# Patient Record
Sex: MALE | Race: WHITE | NOT HISPANIC OR LATINO | Employment: FULL TIME | URBAN - METROPOLITAN AREA
[De-identification: names, ages, dates, MRNs, and addresses within clinical notes are randomized per-mention and may not be internally consistent; named-entity substitution may affect disease eponyms.]

---

## 2023-11-28 ENCOUNTER — LAB VISIT (OUTPATIENT)
Dept: LAB | Facility: HOSPITAL | Age: 48
End: 2023-11-28
Attending: INTERNAL MEDICINE
Payer: COMMERCIAL

## 2023-11-28 ENCOUNTER — OFFICE VISIT (OUTPATIENT)
Dept: HEPATOLOGY | Facility: CLINIC | Age: 48
End: 2023-11-28
Payer: COMMERCIAL

## 2023-11-28 VITALS
TEMPERATURE: 96 F | HEART RATE: 88 BPM | RESPIRATION RATE: 18 BRPM | OXYGEN SATURATION: 98 % | WEIGHT: 243.19 LBS | SYSTOLIC BLOOD PRESSURE: 123 MMHG | HEIGHT: 70 IN | DIASTOLIC BLOOD PRESSURE: 78 MMHG | BODY MASS INDEX: 34.82 KG/M2

## 2023-11-28 DIAGNOSIS — K76.9 LIVER DISEASE, UNSPECIFIED: ICD-10-CM

## 2023-11-28 DIAGNOSIS — K76.9 LIVER LESION: ICD-10-CM

## 2023-11-28 DIAGNOSIS — K76.0 FATTY LIVER: ICD-10-CM

## 2023-11-28 DIAGNOSIS — E66.9 OBESITY (BMI 30.0-34.9): ICD-10-CM

## 2023-11-28 LAB
AFP SERPL-MCNC: 4.9 NG/ML (ref 0–8.4)
ALBUMIN SERPL BCP-MCNC: 4.1 G/DL (ref 3.5–5.2)
ALP SERPL-CCNC: 60 U/L (ref 55–135)
ALT SERPL W/O P-5'-P-CCNC: 33 U/L (ref 10–44)
ANION GAP SERPL CALC-SCNC: 8 MMOL/L (ref 8–16)
AST SERPL-CCNC: 25 U/L (ref 10–40)
BASOPHILS # BLD AUTO: 0.05 K/UL (ref 0–0.2)
BASOPHILS NFR BLD: 0.8 % (ref 0–1.9)
BILIRUB DIRECT SERPL-MCNC: 0.1 MG/DL (ref 0.1–0.3)
BILIRUB SERPL-MCNC: 0.4 MG/DL (ref 0.1–1)
BUN SERPL-MCNC: 11 MG/DL (ref 6–20)
CALCIUM SERPL-MCNC: 9.4 MG/DL (ref 8.7–10.5)
CANCER AG19-9 SERPL-ACNC: 3.4 U/ML (ref 0–40)
CEA SERPL-MCNC: 1.7 NG/ML (ref 0–5)
CERULOPLASMIN SERPL-MCNC: 21 MG/DL (ref 15–45)
CHLORIDE SERPL-SCNC: 107 MMOL/L (ref 95–110)
CO2 SERPL-SCNC: 25 MMOL/L (ref 23–29)
CREAT SERPL-MCNC: 1.1 MG/DL (ref 0.5–1.4)
DIFFERENTIAL METHOD: ABNORMAL
EOSINOPHIL # BLD AUTO: 0.3 K/UL (ref 0–0.5)
EOSINOPHIL NFR BLD: 5.3 % (ref 0–8)
ERYTHROCYTE [DISTWIDTH] IN BLOOD BY AUTOMATED COUNT: 11.6 % (ref 11.5–14.5)
EST. GFR  (NO RACE VARIABLE): >60 ML/MIN/1.73 M^2
FERRITIN SERPL-MCNC: 305 NG/ML (ref 20–300)
GLUCOSE SERPL-MCNC: 95 MG/DL (ref 70–110)
HAV IGG SER QL IA: REACTIVE
HBV CORE AB SERPL QL IA: NORMAL
HBV SURFACE AG SERPL QL IA: NORMAL
HCT VFR BLD AUTO: 43.4 % (ref 40–54)
HCV AB SERPL QL IA: NORMAL
HGB BLD-MCNC: 15 G/DL (ref 14–18)
HIV 1+2 AB+HIV1 P24 AG SERPL QL IA: NORMAL
IMM GRANULOCYTES # BLD AUTO: 0.02 K/UL (ref 0–0.04)
IMM GRANULOCYTES NFR BLD AUTO: 0.3 % (ref 0–0.5)
IRON SERPL-MCNC: 87 UG/DL (ref 45–160)
LYMPHOCYTES # BLD AUTO: 2.6 K/UL (ref 1–4.8)
LYMPHOCYTES NFR BLD: 39.7 % (ref 18–48)
MCH RBC QN AUTO: 31.9 PG (ref 27–31)
MCHC RBC AUTO-ENTMCNC: 34.6 G/DL (ref 32–36)
MCV RBC AUTO: 92 FL (ref 82–98)
MONOCYTES # BLD AUTO: 0.7 K/UL (ref 0.3–1)
MONOCYTES NFR BLD: 11.1 % (ref 4–15)
NEUTROPHILS # BLD AUTO: 2.8 K/UL (ref 1.8–7.7)
NEUTROPHILS NFR BLD: 42.8 % (ref 38–73)
NRBC BLD-RTO: 0 /100 WBC
PLATELET # BLD AUTO: 236 K/UL (ref 150–450)
PMV BLD AUTO: 11.2 FL (ref 9.2–12.9)
POTASSIUM SERPL-SCNC: 3.8 MMOL/L (ref 3.5–5.1)
PROT SERPL-MCNC: 7.6 G/DL (ref 6–8.4)
RBC # BLD AUTO: 4.7 M/UL (ref 4.6–6.2)
SATURATED IRON: 21 % (ref 20–50)
SODIUM SERPL-SCNC: 140 MMOL/L (ref 136–145)
TOTAL IRON BINDING CAPACITY: 416 UG/DL (ref 250–450)
TRANSFERRIN SERPL-MCNC: 281 MG/DL (ref 200–375)
WBC # BLD AUTO: 6.42 K/UL (ref 3.9–12.7)

## 2023-11-28 PROCEDURE — 86790 VIRUS ANTIBODY NOS: CPT | Performed by: INTERNAL MEDICINE

## 2023-11-28 PROCEDURE — 80076 HEPATIC FUNCTION PANEL: CPT | Performed by: INTERNAL MEDICINE

## 2023-11-28 PROCEDURE — 3078F PR MOST RECENT DIASTOLIC BLOOD PRESSURE < 80 MM HG: ICD-10-PCS | Mod: CPTII,S$GLB,, | Performed by: INTERNAL MEDICINE

## 2023-11-28 PROCEDURE — 86803 HEPATITIS C AB TEST: CPT | Performed by: INTERNAL MEDICINE

## 2023-11-28 PROCEDURE — 3074F PR MOST RECENT SYSTOLIC BLOOD PRESSURE < 130 MM HG: ICD-10-PCS | Mod: CPTII,S$GLB,, | Performed by: INTERNAL MEDICINE

## 2023-11-28 PROCEDURE — 3078F DIAST BP <80 MM HG: CPT | Mod: CPTII,S$GLB,, | Performed by: INTERNAL MEDICINE

## 2023-11-28 PROCEDURE — 82103 ALPHA-1-ANTITRYPSIN TOTAL: CPT | Performed by: INTERNAL MEDICINE

## 2023-11-28 PROCEDURE — 82378 CARCINOEMBRYONIC ANTIGEN: CPT | Performed by: INTERNAL MEDICINE

## 2023-11-28 PROCEDURE — 3074F SYST BP LT 130 MM HG: CPT | Mod: CPTII,S$GLB,, | Performed by: INTERNAL MEDICINE

## 2023-11-28 PROCEDURE — 87389 HIV-1 AG W/HIV-1&-2 AB AG IA: CPT | Performed by: INTERNAL MEDICINE

## 2023-11-28 PROCEDURE — 86301 IMMUNOASSAY TUMOR CA 19-9: CPT | Performed by: INTERNAL MEDICINE

## 2023-11-28 PROCEDURE — 87340 HEPATITIS B SURFACE AG IA: CPT | Performed by: INTERNAL MEDICINE

## 2023-11-28 PROCEDURE — 82104 ALPHA-1-ANTITRYPSIN PHENO: CPT | Performed by: INTERNAL MEDICINE

## 2023-11-28 PROCEDURE — 86706 HEP B SURFACE ANTIBODY: CPT | Performed by: INTERNAL MEDICINE

## 2023-11-28 PROCEDURE — 99999 PR PBB SHADOW E&M-NEW PATIENT-LVL IV: ICD-10-PCS | Mod: PBBFAC,,, | Performed by: INTERNAL MEDICINE

## 2023-11-28 PROCEDURE — 82105 ALPHA-FETOPROTEIN SERUM: CPT | Performed by: INTERNAL MEDICINE

## 2023-11-28 PROCEDURE — 99203 OFFICE O/P NEW LOW 30 MIN: CPT | Mod: S$GLB,,, | Performed by: INTERNAL MEDICINE

## 2023-11-28 PROCEDURE — 85025 COMPLETE CBC W/AUTO DIFF WBC: CPT | Performed by: INTERNAL MEDICINE

## 2023-11-28 PROCEDURE — 83540 ASSAY OF IRON: CPT | Performed by: INTERNAL MEDICINE

## 2023-11-28 PROCEDURE — 1159F PR MEDICATION LIST DOCUMENTED IN MEDICAL RECORD: ICD-10-PCS | Mod: CPTII,S$GLB,, | Performed by: INTERNAL MEDICINE

## 2023-11-28 PROCEDURE — 86038 ANTINUCLEAR ANTIBODIES: CPT | Performed by: INTERNAL MEDICINE

## 2023-11-28 PROCEDURE — 3008F BODY MASS INDEX DOCD: CPT | Mod: CPTII,S$GLB,, | Performed by: INTERNAL MEDICINE

## 2023-11-28 PROCEDURE — 99999 PR PBB SHADOW E&M-NEW PATIENT-LVL IV: CPT | Mod: PBBFAC,,, | Performed by: INTERNAL MEDICINE

## 2023-11-28 PROCEDURE — 80048 BASIC METABOLIC PNL TOTAL CA: CPT | Performed by: INTERNAL MEDICINE

## 2023-11-28 PROCEDURE — 82728 ASSAY OF FERRITIN: CPT | Performed by: INTERNAL MEDICINE

## 2023-11-28 PROCEDURE — 1159F MED LIST DOCD IN RCRD: CPT | Mod: CPTII,S$GLB,, | Performed by: INTERNAL MEDICINE

## 2023-11-28 PROCEDURE — 99203 PR OFFICE/OUTPT VISIT, NEW, LEVL III, 30-44 MIN: ICD-10-PCS | Mod: S$GLB,,, | Performed by: INTERNAL MEDICINE

## 2023-11-28 PROCEDURE — 1160F PR REVIEW ALL MEDS BY PRESCRIBER/CLIN PHARMACIST DOCUMENTED: ICD-10-PCS | Mod: CPTII,S$GLB,, | Performed by: INTERNAL MEDICINE

## 2023-11-28 PROCEDURE — 86704 HEP B CORE ANTIBODY TOTAL: CPT | Performed by: INTERNAL MEDICINE

## 2023-11-28 PROCEDURE — 1160F RVW MEDS BY RX/DR IN RCRD: CPT | Mod: CPTII,S$GLB,, | Performed by: INTERNAL MEDICINE

## 2023-11-28 PROCEDURE — 84466 ASSAY OF TRANSFERRIN: CPT | Performed by: INTERNAL MEDICINE

## 2023-11-28 PROCEDURE — 3008F PR BODY MASS INDEX (BMI) DOCUMENTED: ICD-10-PCS | Mod: CPTII,S$GLB,, | Performed by: INTERNAL MEDICINE

## 2023-11-28 PROCEDURE — 82390 ASSAY OF CERULOPLASMIN: CPT | Performed by: INTERNAL MEDICINE

## 2023-11-28 RX ORDER — ROSUVASTATIN CALCIUM 5 MG/1
5 TABLET, COATED ORAL DAILY
COMMUNITY

## 2023-11-28 RX ORDER — OMEPRAZOLE 20 MG/1
TABLET, DELAYED RELEASE ORAL DAILY
COMMUNITY

## 2023-11-28 NOTE — PROGRESS NOTES
"Hepatology Consult Note    Referring provider: Aaareferral Self  PCP: No, Primary Doctor    Chief complaint: liver lesions    HPI:  Jm Saab is a 48 y.o. male with obesity (BMI 34.9), HLD, LEO, who was referred to Hepatology Clinic for evaluation of liver lesions. First diagnosed with fatty liver and liver lesions after having an ultrasound of the abdomen for post-prandial abdominal pain.     Regarding alcohol use, he 6-12 12oz ciders, or ~2 bottles wine on one day during the weekend. This happens maybe 1-2 a month. No prior attempts structure sobriety.  Denies illicit drug use, blood transfusions, prior tattoos. MASLD risk factors: obesity (current weight is heavies weight), HLD. Denies history of DM, HTN. Does not know his family history as he is adopted.    The patient denies signs/symptoms of decompensated liver disease, including no recent abdominal distension, jaundice, gross GI bleeding. The patient denies prior evaluation by hepatologist, liver biopsy, paracentesis.    PMHx:  - Obesity  - HLD  - LEO     PSHx:   - Denies prior abdominal surgeries.     History reviewed. No pertinent family history.    Social History     Tobacco Use    Smoking status: Former     Types: Cigarettes    Smokeless tobacco: Former       Current Outpatient Medications   Medication Sig Dispense Refill    omeprazole (PRILOSEC OTC) 20 MG tablet Take by mouth once daily.      rosuvastatin (CRESTOR) 5 MG tablet Take 5 mg by mouth once daily.       No current facility-administered medications for this visit.       Review of patient's allergies indicates:  No Known Allergies         Vitals:    11/28/23 1344   BP: 123/78   Pulse: 88   Resp: 18   Temp: 96.2 °F (35.7 °C)   TempSrc: Oral   SpO2: 98%   Weight: 110.3 kg (243 lb 2.7 oz)   Height: 5' 10" (1.778 m)   Body mass index is 34.89 kg/m².    Physical Exam  Constitutional:       General: He is not in acute distress.  Eyes:      General: No scleral icterus.  Cardiovascular:      Rate and " "Rhythm: Normal rate.   Pulmonary:      Effort: Pulmonary effort is normal.   Abdominal:      General: Abdomen is protuberant. There is no distension.      Palpations: Abdomen is soft. There is no fluid wave, hepatomegaly or splenomegaly.      Tenderness: There is no abdominal tenderness.   Musculoskeletal:      Right lower leg: No edema.      Left lower leg: No edema.   Skin:     General: Skin is warm.      Coloration: Skin is not jaundiced.      Comments: No spider angiomas. No palmar erythema. No leukonychia.    Neurological:      General: No focal deficit present.      Mental Status: He is alert and oriented to person, place, and time.   Psychiatric:         Behavior: Behavior is cooperative.         Thought Content: Thought content normal.         LABS: I personally reviewed pertinent laboratory findings.    No results found for: "WBC", "HGB", "HCT", "MCV", "PLT"    No results found for: "NA", "K", "CL", "CO2", "BUN", "CREATININE", "CALCIUM", "ANIONGAP", "ESTGFRAFRICA", "EGFRNONAA"    No results found for: "ALT", "AST", "GGT", "ALKPHOS", "BILITOT"    No results found for: "HAV", "HEPAIGM", "HEPBIGM", "HEPBCAB", "HBEAG", "HEPCAB"    No results found for: "MARJORIE", "MITOAB", "SMOOTHMUSCAB", "IGG", "CERULOPLSM"     Computed MELD 3.0 unavailable. Necessary lab results were not found in the last year.  Computed MELD-Na unavailable. Necessary lab results were not found in the last year.       IMAGING: I personally reviewed imaging studies.    US Abdomen Complete 11/10/2023  There are 3 hyperechoic lesions within the anterior right liver lobe, favor hemangiomata.  Consider correlation with CT or MRI.     Echogenic liver parenchyma, likely mild hepatic steatosis.     Cholelithiasis without secondary findings of acute cholecystitis.     Assessment:  Jm Saab is a 48 y.o. male with obesity (BMI 34.9), HLD, LEO, who was referred to Hepatology Clinic for evaluation of liver lesions. Per review of outside records, liver " enzymes were within normal limits in 2023. Bilirubin and albumin is normal. Platelets within normal limits. On ultrasound, the liver has increased echogenicity, likely from mild steatosis, and there are 3 lesions in the right lobe, which likely represent hemangiomata. Plan  to complete chronic liver disease workup, non-invasive measures of fibrosis, and dedicated imaging of the liver.     1. Liver lesion    2. Fatty liver    3. Liver disease, unspecified    4. Obesity (BMI 30.0-34.9)      Recommendations:  - BMP, LFTs, CBC, INR  - MARJORIE, A1AT phenotype, ceruloplasmin   - Iron, TIBC, ferritin   - HAV IgG, HBsAg, HBsAb, HBc Total Ab, HCV ab, HIV  - FibroSure, FibroScan   - AFP, CEA, CA 19-9   - MRI abdomen   - Counseled on weight loss (goal 5-10% body weight), Mediterranean diet     Return to clinic in 3 months.    I have sent communication to the referring physician and/or primary care provider.    Anahi Billings MD  Staff Physician  Hepatology and Liver Transplant  [facility]  [facility] [place]

## 2023-11-29 DIAGNOSIS — K76.9 LIVER LESION: Primary | ICD-10-CM

## 2023-11-29 DIAGNOSIS — K76.9 LIVER DISEASE, UNSPECIFIED: ICD-10-CM

## 2023-11-29 LAB — ANA SER QL IF: NORMAL

## 2023-12-01 LAB
A1AT PHENOTYP SERPL-IMP: NORMAL BANDS
A1AT SERPL NEPH-MCNC: 126 MG/DL (ref 100–190)
HBV SURFACE AB SER QL IA: NEGATIVE
HBV SURFACE AB SERPL IA-ACNC: <3 MIU/ML

## 2023-12-04 ENCOUNTER — LAB VISIT (OUTPATIENT)
Dept: LAB | Facility: HOSPITAL | Age: 48
End: 2023-12-04
Payer: COMMERCIAL

## 2023-12-04 DIAGNOSIS — K76.9 LIVER LESION: ICD-10-CM

## 2023-12-04 DIAGNOSIS — K76.9 LIVER DISEASE, UNSPECIFIED: ICD-10-CM

## 2023-12-04 PROCEDURE — 36415 COLL VENOUS BLD VENIPUNCTURE: CPT | Mod: PN | Performed by: INTERNAL MEDICINE

## 2023-12-04 PROCEDURE — 81596 NFCT DS CHRNC HCV 6 ASSAYS: CPT | Performed by: INTERNAL MEDICINE

## 2023-12-06 LAB
A2 MACROGLOB SERPL-MCNC: 142 MG/DL (ref 100–280)
ALT SERPL W P-5'-P-CCNC: 38 U/L (ref 7–55)
APO A-I SERPL-MCNC: 128 MG/DL
BILIRUB SERPL-MCNC: 0.3 MG/DL (ref 0–1.2)
BIOPREDICTIVE SERIAL NUMBER: NORMAL
FIBROSIS STAGE SERPL QL: NORMAL
FIBROTEST INTERPRETATION: NORMAL
FIBROTEST-ACTITEST COMMENT: NORMAL
GGT SERPL-CCNC: 28 U/L (ref 8–61)
HAPTOGLOB SERPL-MCNC: 175 MG/DL (ref 30–200)
LIVER FIBR SCORE SERPL CALC.FIBROSURE: 0.09
NECROINFLAMMAT INTERP: NORMAL
NECROINFLAMMATORY ACT GRADE SERPL QL: NORMAL
NECROINFLAMMATORY ACT SCORE SERPL: 0.16

## 2023-12-20 ENCOUNTER — HOSPITAL ENCOUNTER (OUTPATIENT)
Dept: RADIOLOGY | Facility: HOSPITAL | Age: 48
Discharge: HOME | End: 2023-12-20
Attending: INTERNAL MEDICINE
Payer: COMMERCIAL

## 2023-12-20 DIAGNOSIS — K76.9 LIVER LESION: ICD-10-CM

## 2023-12-20 DIAGNOSIS — K76.9 LIVER DISEASE, UNSPECIFIED: ICD-10-CM

## 2023-12-20 PROCEDURE — A9585 GADOBUTROL INJECTION: HCPCS | Performed by: INTERNAL MEDICINE

## 2023-12-20 PROCEDURE — 74183 MRI ABDOMEN W WO CONTRAST: ICD-10-PCS | Mod: 26,,, | Performed by: RADIOLOGY

## 2023-12-20 PROCEDURE — 25500020 PHARM REV CODE 255: Performed by: INTERNAL MEDICINE

## 2023-12-20 PROCEDURE — 74183 MRI ABD W/O CNTR FLWD CNTR: CPT | Mod: 26,,, | Performed by: RADIOLOGY

## 2023-12-20 PROCEDURE — 74183 MRI ABD W/O CNTR FLWD CNTR: CPT | Mod: TC

## 2023-12-20 RX ORDER — GADOBUTROL 604.72 MG/ML
10 INJECTION INTRAVENOUS
Status: COMPLETED | OUTPATIENT
Start: 2023-12-20 | End: 2023-12-20

## 2023-12-20 RX ADMIN — GADOBUTROL 10 ML: 604.72 INJECTION INTRAVENOUS at 07:12

## 2024-02-26 ENCOUNTER — OFFICE VISIT (OUTPATIENT)
Dept: HEPATOLOGY | Facility: CLINIC | Age: 49
End: 2024-02-26
Payer: COMMERCIAL

## 2024-02-26 ENCOUNTER — PROCEDURE VISIT (OUTPATIENT)
Dept: HEPATOLOGY | Facility: CLINIC | Age: 49
End: 2024-02-26
Payer: COMMERCIAL

## 2024-02-26 VITALS
HEART RATE: 72 BPM | SYSTOLIC BLOOD PRESSURE: 135 MMHG | RESPIRATION RATE: 19 BRPM | WEIGHT: 243.19 LBS | BODY MASS INDEX: 34.82 KG/M2 | OXYGEN SATURATION: 98 % | DIASTOLIC BLOOD PRESSURE: 72 MMHG | HEIGHT: 70 IN

## 2024-02-26 DIAGNOSIS — K76.9 LIVER DISEASE, UNSPECIFIED: ICD-10-CM

## 2024-02-26 DIAGNOSIS — Z78.9 ALCOHOL USE: ICD-10-CM

## 2024-02-26 DIAGNOSIS — K76.0 FATTY LIVER: ICD-10-CM

## 2024-02-26 DIAGNOSIS — E66.9 CLASS 1 OBESITY WITH BODY MASS INDEX (BMI) OF 34.0 TO 34.9 IN ADULT, UNSPECIFIED OBESITY TYPE, UNSPECIFIED WHETHER SERIOUS COMORBIDITY PRESENT: ICD-10-CM

## 2024-02-26 DIAGNOSIS — K76.9 LIVER LESION: ICD-10-CM

## 2024-02-26 DIAGNOSIS — D18.03 HEPATIC HEMANGIOMA: ICD-10-CM

## 2024-02-26 PROCEDURE — 99999 PR PBB SHADOW E&M-EST. PATIENT-LVL III: CPT | Mod: PBBFAC,,, | Performed by: INTERNAL MEDICINE

## 2024-02-26 PROCEDURE — 3008F BODY MASS INDEX DOCD: CPT | Mod: CPTII,S$GLB,, | Performed by: INTERNAL MEDICINE

## 2024-02-26 PROCEDURE — 91200 LIVER ELASTOGRAPHY: CPT | Mod: S$GLB,,, | Performed by: INTERNAL MEDICINE

## 2024-02-26 PROCEDURE — 99213 OFFICE O/P EST LOW 20 MIN: CPT | Mod: S$GLB,,, | Performed by: INTERNAL MEDICINE

## 2024-02-26 PROCEDURE — 1159F MED LIST DOCD IN RCRD: CPT | Mod: CPTII,S$GLB,, | Performed by: INTERNAL MEDICINE

## 2024-02-26 PROCEDURE — 1160F RVW MEDS BY RX/DR IN RCRD: CPT | Mod: CPTII,S$GLB,, | Performed by: INTERNAL MEDICINE

## 2024-02-26 PROCEDURE — 3078F DIAST BP <80 MM HG: CPT | Mod: CPTII,S$GLB,, | Performed by: INTERNAL MEDICINE

## 2024-02-26 PROCEDURE — 3075F SYST BP GE 130 - 139MM HG: CPT | Mod: CPTII,S$GLB,, | Performed by: INTERNAL MEDICINE

## 2024-02-26 NOTE — PROCEDURES
FibroScan [place]    Date/Time: 2/26/2024 8:30 AM    Performed by: Cindy Mejia MA  Authorized by: Anahi Billings MD    Diagnosis:  NAFLD    Probe:  XL    Universal Protocol: Patient's identity, procedure and site were verified, confirmatory pause was performed.  Discussed procedure including risks and potential complications.  Questions answered.  Patient verbalizes understanding and wishes to proceed with VCTE.     Procedure: After providing explanations of the procedure, patient was placed in the supine position with right arm in maximum abduction to allow optimal exposure of right lateral abdomen.  Patient was briefly assessed, Testing was performed in the mid-axillary location, 50Hz Shear Wave pulses were applied and the resulting Shear Wave and Propagation Speed detected with a 3.5 MHz ultrasonic signal, using the FibroScan probe, Skin to liver capsule distance and liver parenchyma were accessed during the entire examination with the FibroScan probe, Patient was instructed to breathe normally and to abstain from sudden movements during the procedure, allowing for random measurements of liver stiffness. At least 10 Shear Waves were produced, Individual measurements of each Shear Wave were calculated.  Patient tolerated the procedure well with no complications.  Meets discharge criteria as was dismissed.  Rates pain 0 out of 10.  Patient will follow up with ordering provider to review results.    Findings  Median liver stiffness score:  4  CAP Reading: dB/m:  315    Interpretation  Fibrosis interpretation is based on medial liver stiffness - Kilopascal (kPa).    Fibrosis Stage:  F 0-1  Steatosis interpretation is based on controlled attenuation parameter - (dB/m).    Steatosis Grade:  S2

## 2024-02-26 NOTE — PROGRESS NOTES
"Hepatology Follow Up Note    Referring provider: No ref. provider found  PCP: No, Primary Doctor    Chief complaint: follow up liver lesions    Last seen in clinic on: 11/28/23    Brief HPI:  Jm Saab is a 48 y.o. male with liver lesions, fatty liver, obesity (BMI 34), HLD, LEO, who presents for scheduled follow up.    Interval Events:  - Feels well. Denies acute complaints.  - Currently drinking 6-12 12oz ciders, or ~2 bottles wine on one day during the weekend.   - This is the extent of the patient's complaints at this time.    History reviewed. No pertinent past medical history.    History reviewed. No pertinent surgical history.    History reviewed. No pertinent family history.    Social History     Tobacco Use    Smoking status: Former     Types: Cigarettes    Smokeless tobacco: Former       Current Outpatient Medications   Medication Sig Dispense Refill    omeprazole (PRILOSEC OTC) 20 MG tablet Take by mouth once daily.      rosuvastatin (CRESTOR) 5 MG tablet Take 5 mg by mouth once daily.       No current facility-administered medications for this visit.       Review of patient's allergies indicates:  No Known Allergies         Vitals:    02/26/24 0816   BP: 135/72   Pulse: 72   Resp: 19   SpO2: 98%   Weight: 110.3 kg (243 lb 2.7 oz)   Height: 5' 10" (1.778 m)   Body mass index is 34.89 kg/m².    Physical Exam  Constitutional:       General: He is not in acute distress.  Eyes:      General: No scleral icterus.  Cardiovascular:      Rate and Rhythm: Normal rate.   Pulmonary:      Effort: Pulmonary effort is normal.   Abdominal:      General: Abdomen is protuberant. There is no distension.   Skin:     Coloration: Skin is not jaundiced.   Neurological:      General: No focal deficit present.      Mental Status: He is alert.   Psychiatric:         Thought Content: Thought content normal.         LABS: I personally reviewed pertinent laboratory findings.    Lab Results   Component Value Date    WBC 6.42 " 11/28/2023    HGB 15.0 11/28/2023    HCT 43.4 11/28/2023    MCV 92 11/28/2023     11/28/2023       Lab Results   Component Value Date     11/28/2023    K 3.8 11/28/2023     11/28/2023    CO2 25 11/28/2023    BUN 11 11/28/2023    CREATININE 1.1 11/28/2023    CALCIUM 9.4 11/28/2023    ANIONGAP 8 11/28/2023       Lab Results   Component Value Date    ALT 33 11/28/2023    AST 25 11/28/2023    GGT 28 12/04/2023    ALKPHOS 60 11/28/2023    BILITOT 0.4 11/28/2023       Lab Results   Component Value Date    HEPBCAB Non-reactive 11/28/2023    HEPCAB Non-reactive 11/28/2023       Lab Results   Component Value Date    CERULOPLSM 21.0 11/28/2023      Lab Results   Component Value Date    AFP 4.9 11/28/2023     Lab Results   Component Value Date    CEA 1.7 11/28/2023     CA 19-9 3.4 11/28/23    Computed MELD 3.0 unavailable. Necessary lab results were not found in the last year.  Computed MELD-Na unavailable. Necessary lab results were not found in the last year.       IMAGING: I personally reviewed imaging studies.    MRI Abdomen w/wo contrast 12/20/2023  Impression:     Liver is upper limits of normal in size.  Several T2 hyperintense lesions throughout the liver with peripheral nodular enhancement and progressive centripetal filling consistent with hemangiomas.  These findings likely correspond to hyperechoic lesions on prior ultrasound.  No suspicious hepatic lesions.     Cholelithiasis.    FibroScan 2/26/2024  Findings  Median liver stiffness score:  4  CAP Reading: dB/m:  315     Interpretation  Fibrosis interpretation is based on medial liver stiffness - Kilopascal (kPa).     Fibrosis Stage:  F 0-1  Steatosis interpretation is based on controlled attenuation parameter - (dB/m).     Steatosis Grade:  S2    Assessment:  Jm Saab is a 48 y.o. male with liver lesions, fatty liver, obesity (BMI 34), HLD, LEO, who presents for scheduled follow up. Per chart review, liver enzymes were within normal limits.  Bilirubin and albumin are normal. Platelets within normal limits. On outside  ultrasound (11/10/2023), the liver has increased echogenicity, likely from mild steatosis, and there are 3 lesions in the right lobe, which likely represent hemangiomata. On MRI (12/20/23), the liver is normal in size with several lesions throughout the liver with peripheral nodular enhancement and progressive centripetal filling consistent with hemangiomas, spleen is unremarkable, and there is no ascites.    Presentation seem consistent with metabolic dysfunction associated steatotic liver disease (MASLD).     Discussed the benign nature of hepatic hemangiomas.  No further evaluation or surveillance is warranted.    1. Fatty liver    2. Hepatic hemangioma    3. Class 1 obesity with body mass index (BMI) of 34.0 to 34.9 in adult, unspecified obesity type, unspecified whether serious comorbidity present    4. Alcohol use      Recommendations:  - No further evaluation or surveillance is warranted for hepatic hemangiomas  - Recommend hepatitis B vaccination   - Recommend weight loss (goal 5-10% of body weight)  - Recommend Mediterranean diet   - Counseled to limit alcohol use     All questions were answered.    Return to clinic as needed.    Anahi Billings MD  Staff Physician  Hepatology and Liver Transplant  [facility]  [facility] [place]